# Patient Record
Sex: MALE | Race: BLACK OR AFRICAN AMERICAN | NOT HISPANIC OR LATINO | URBAN - METROPOLITAN AREA
[De-identification: names, ages, dates, MRNs, and addresses within clinical notes are randomized per-mention and may not be internally consistent; named-entity substitution may affect disease eponyms.]

---
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
## 2022-03-14 ENCOUNTER — EMERGENCY (EMERGENCY)
Facility: HOSPITAL | Age: 22
LOS: 1 days | Discharge: HOME | End: 2022-03-14
Attending: EMERGENCY MEDICINE | Admitting: EMERGENCY MEDICINE
Payer: COMMERCIAL

## 2022-03-14 VITALS
TEMPERATURE: 99 F | WEIGHT: 235.01 LBS | RESPIRATION RATE: 16 BRPM | DIASTOLIC BLOOD PRESSURE: 88 MMHG | HEART RATE: 64 BPM | OXYGEN SATURATION: 100 % | HEIGHT: 74 IN | SYSTOLIC BLOOD PRESSURE: 141 MMHG

## 2022-03-14 VITALS
DIASTOLIC BLOOD PRESSURE: 77 MMHG | HEART RATE: 77 BPM | TEMPERATURE: 98 F | OXYGEN SATURATION: 99 % | RESPIRATION RATE: 18 BRPM | SYSTOLIC BLOOD PRESSURE: 132 MMHG

## 2022-03-14 PROCEDURE — 71046 X-RAY EXAM CHEST 2 VIEWS: CPT | Mod: 26

## 2022-03-14 PROCEDURE — G1004: CPT

## 2022-03-14 PROCEDURE — 93010 ELECTROCARDIOGRAM REPORT: CPT

## 2022-03-14 PROCEDURE — 71250 CT THORAX DX C-: CPT | Mod: MG

## 2022-03-14 PROCEDURE — 99284 EMERGENCY DEPT VISIT MOD MDM: CPT | Mod: 25

## 2022-03-14 PROCEDURE — 99284 EMERGENCY DEPT VISIT MOD MDM: CPT

## 2022-03-14 PROCEDURE — 71046 X-RAY EXAM CHEST 2 VIEWS: CPT

## 2022-03-14 PROCEDURE — 93005 ELECTROCARDIOGRAM TRACING: CPT

## 2022-03-14 PROCEDURE — 71250 CT THORAX DX C-: CPT | Mod: 26,MG

## 2022-03-14 RX ORDER — ACETAMINOPHEN 500 MG
975 TABLET ORAL ONCE
Refills: 0 | Status: COMPLETED | OUTPATIENT
Start: 2022-03-14 | End: 2022-03-14

## 2022-03-14 RX ORDER — LIDOCAINE 4 G/100G
1 CREAM TOPICAL ONCE
Refills: 0 | Status: COMPLETED | OUTPATIENT
Start: 2022-03-14 | End: 2022-03-14

## 2022-03-14 RX ADMIN — Medication 975 MILLIGRAM(S): at 11:11

## 2022-03-14 RX ADMIN — LIDOCAINE 1 PATCH: 4 CREAM TOPICAL at 08:41

## 2022-03-14 RX ADMIN — Medication 975 MILLIGRAM(S): at 08:41
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
## 2022-03-14 NOTE — ED PROVIDER NOTE - NS ED ROS FT
GENERAL: no fever, no chills  EYES: no change in vision, no irritation, no discharge, no redness, no pain  HEENT: no trouble swallowing or speaking  CARDIAC: +chest pain, no palpitations   PULMONARY: no cough, no shortness of breath, no wheezing  GI: no abdominal pain, no nausea, no vomiting, no diarrhea, no constipation  : no changes in urination  SKIN: no rashes  NEURO: no headache, no numbness, no weakness  MSK: no joint pain, +muscle pain, no back pain, no calf pain

## 2022-03-14 NOTE — ED PROVIDER NOTE - PROGRESS NOTE DETAILS
Steve Hatch, PGY3: On reassessment, patient w/ sternal tenderness. No acute fx on CXR but will obtain CT chest to evaluate for sternal fracture vs occult rib fx. Sofia Whitaker MD  patient is feeling better, CT no fractures; will d/c home.

## 2022-03-14 NOTE — ED PROVIDER NOTE - PATIENT PORTAL LINK FT
You can access the FollowMyHealth Patient Portal offered by [facility] by registering at the following website: [email or web address]. By joining [employer]’s FollowMyHealth portal, you will also be able to view your health information using other applications (apps) compatible with our system.

## 2022-03-14 NOTE — ED PROVIDER NOTE - PHYSICAL EXAMINATION
GENERAL: A&Ox3, non-toxic appearing, no acute distress  HEENT: NCAT, EOMI, oral mucosa moist, normal conjunctiva  RESP: CTAB, no respiratory distress, no wheezes/rhonchi/rales, speaking in full sentences  CV: RRR, no murmurs/rubs/gallops  ABDOMEN: soft, non-tender, non-distended, no guarding  MSK: no visible deformities, R trapezius tenderness w/ hypertrophy, no sternal tenderness, mild tenderness to anterior ribs 6-10, no crepitus, no flail segments, no midline spinal tenderness, no step-offs   NEURO: no focal sensory or motor deficits, CN 2-12 grossly intact  SKIN: warm, normal color, well perfused, no rash  PSYCH: normal affect GENERAL: A&Ox3, non-toxic appearing, no acute distress  HEENT: NCAT, EOMI, oral mucosa moist, normal conjunctiva  RESP: CTAB, no respiratory distress, no wheezes/rhonchi/rales, speaking in full sentences  CV: RRR, no murmurs/rubs/gallops  ABDOMEN: soft, non-tender, non-distended, no guarding  MSK: no visible deformities, R trapezius tenderness w/ hypertrophy, +sternal tenderness, mild tenderness to anterior ribs 6-10, no crepitus, no flail segments, no midline spinal tenderness, no step-offs   NEURO: no focal sensory or motor deficits, CN 2-12 grossly intact  SKIN: warm, normal color, well perfused, no rash  PSYCH: normal affect

## 2024-04-12 NOTE — ED ADULT TRIAGE NOTE - TEMPERATURE IN CELSIUS (DEGREES C)
Called and left a vm requesting a return call to schedule an appt w Dr. Butts.   
Left a vm requesting a return call to schedule appt. Per Dr. Butts \"Schedule whenever/wherever she wants.\"  
37.2

## 2024-05-14 NOTE — ED ADULT NURSE NOTE - NS ED PATIENT SAFETY CONCERN
Well-controlled, continue current medications  -A1c 6.1 May 2024  -Continue metformin 500mg BID  -Counseled on cessation of sugary drinks and foods   No

## 2025-01-30 NOTE — ED PROVIDER NOTE - WR ORDER NAME 1
Physical Therapy Visit    Visit Type: Daily Treatment Note  Visit: 2  Referring Provider: [facility] Khoury*  Medical Diagnosis (from order): R53.1 - Weakness generalized     SUBJECTIVE                                                                                                               States she aches all over her body.  Functional Change: Patient ambulates with a 4 wheeled walker and contact guard assist by  or therapist.    Pain / Symptoms  - Pain rating (out of 10): Current: 8       OBJECTIVE                                                                                                                                       Treatment     Therapeutic Exercise  Nustep seat 5 R2 x 6 min  Seated long arc quad 2# x 10 bilateral  Seated marching 2# x 10 bilateral  Seated hip isometrics ADD/abduction x 10 bilateral  Supine bridging x 15  Supine marching 2# x 15 bilateral  Supine short arc quad 2# x 15 bilateral  Supine straight leg raise 0# x 10 min A  Sit to standing x 10 with walker  Ambulation with walker 60 feet with 4 wheeled walker and CGA    Skilled input: verbal instruction/cues and tactile instruction/cues    Writer verbally educated and received verbal consent for hand placement, positioning of patient, and techniques to be performed today from patient for hand placement and palpation for techniques and therapist position for techniques as described above and how they are pertinent to the patient's plan of care.  Home Exercise Program  Seated trunk stabilization sitting at edge of chair or couch unsupported 5 minutes with walker in front of her.      ASSESSMENT                                                                                                            Patient needs contact guard assist with standing and seated activities due to poor trunk and lower extremity strength.  She was able to complete all exercises with some independence, but needed assistance with many  activities.  Gave patient and her  one new home exercise to work on trunk stabilization and balance sitting at edge of chair.  Will continue with trunk and lower extremity strength and gait as she is able.  Pain/symptoms after session (out of 10): 8  Education:   - Results of above outlined education: Verbalizes understanding    PLAN                                                                                                                           Suggestions for next session as indicated: Progress per plan of care       Therapy procedure time and total treatment time can be found documented on the Time Entry flowsheet     Xray Chest 1 View AP/PA